# Patient Record
Sex: MALE | Race: WHITE | Employment: FULL TIME | URBAN - METROPOLITAN AREA
[De-identification: names, ages, dates, MRNs, and addresses within clinical notes are randomized per-mention and may not be internally consistent; named-entity substitution may affect disease eponyms.]

---

## 2019-04-29 ENCOUNTER — HOSPITAL ENCOUNTER (OUTPATIENT)
Dept: PHYSICAL THERAPY | Age: 54
Setting detail: THERAPIES SERIES
Discharge: HOME | End: 2019-04-29
Payer: COMMERCIAL

## 2019-04-29 PROCEDURE — 97161 PT EVAL LOW COMPLEX 20 MIN: CPT

## 2019-04-29 PROCEDURE — 97016 VASOPNEUMATIC DEVICE THERAPY: CPT

## 2019-04-29 PROCEDURE — G0283 ELEC STIM OTHER THAN WOUND: HCPCS

## 2019-05-01 NOTE — PROGRESS NOTES
Physical Therapy  Initial Assessment  Date: 2019  Patient Name: Steffen Bustamante  MRN: [id number]  : 1965     Treatment Diagnosis: Decreased mobility of the (R) shoulder with scapular weakness    Restrictions  Position Activity Restriction  Other position/activity restrictions: No activity restriction per physician's order. General  Chart Reviewed: Yes  Patient assessed for rehabilitation services?: Yes  Response To Previous Treatment: Not applicable  Family / Caregiver Present: No  Referring Practitioner: Paige Liriano MD  Referral Date : 19  Diagnosis: Post-acromioplasty  Follows Commands: Within Functional Limits  General Comment  Comments: Pt. did have an MRI (min. supraspinatus tendinopathy)  PT Visit Information  Onset Date: 19  PT Insurance Information: Medical [place]  Total # of Visits Approved: 12  Total # of Visits to Date: 1  No Show: 0  Canceled Appointment: 0  Subjective  Pt. has never had physical therapy before. Had a shoulder scope last Wednesday (19) at [facility] and had some spurs removed. Pt. stated he has trouble with mobility and wants to gain back his ROM. At rest pt's shoulder is pain free, but has pain with movements (flexion/extension/IR/abduction)  Pain Screening  Patient Currently in Pain: No  Pain Assessment  Multiple Pain Sites: No  Vision/Hearing  Vision  Vision: Within Functional Limits  Hearing  Hearing: Within functional limits  Orientation  Overall Orientation Status: Within Normal Limits  Social/Functional History  Occupation: Full time employment  Type of occupation: Superintendent    Objective  Observation/Palpation  Palpation: Tender to the touch (R) AC region, the ports, and lateral portion of the (R) shoulder  Observation: Slight upward rotation of the (R) scapula  Range of Motion  PROM RUE (degrees)  RUE General PROM: Firm end feel for both (R) shoulder flexion and abduction.  Scapular elevation - pt. was able to gain full motion, but (R) shoulder motion wasn't smooth, Elbow flexion and extension - WNL  R Shoulder Flex  0-180: 80 degrees  R Shoulder ABduction 0-180: 67 degrees  R Shoulder Int Rotation  0-70: 20 degrees @ 45 degrees  R Shoulder Ext Rotation  0-90: 10 degrees @ 45 degrees  Spine  Cervical: WNL  Additional Measures  Special Tests: Flip test (+) - weakness of the serratus anterior and upper trapezius    Assessment  Conditions Requiring Skilled Therapeutic Intervention  Body structures, Functions, Activity limitations: Decreased functional mobility ; Decreased ROM; Decreased strength;Decreased ADL status; Increased Pain  Assessment: Limited motion of the (R) shoulder was noted, with tenderness surrounding the joint. Scapular weakness was demonstrated with the positive flip test.  Treatment Diagnosis: Decreased mobility of the (R) shoulder with scapular weakness  Prognosis: Good  Decision Making: Low Complexity  History: No personal factors were apparent that may impact the pt's plan of care. Exam: NRPS 0/10, limited ROM of the (R) shoulder, decreased (R) scapular/shoulder strength. Clinical Presentation: Pt's symptoms are evolving    Activity Tolerance  Patient Tolerated treatment well    Plan  Times per week: 3  Plan weeks: 4  Current Treatment Recommendations: ROM, Pain Management, Strengthening    Outcomes Score  QuickDASH Total Score: 28          Goals  Short term goals  Time Frame for Short term goals: 6 visits  Short term goal 1: Pt. will demonstrate independence with home exercise program.  Short term goal 2: Pt. will demonstrate increased ROM of the (R) shoulder. Long term goals  Time Frame for Long term goals : 12 visits  Long term goal 1: Pt. will demonstrate WNL for ROM of the (R) shoulder. Long term goal 2: Pt's QuickDASH score will improve by 15 points compared to initial QuickDASH score. Long term goal 3: Pt. will report 0/10 on the pain scale for functional ADLs.     Patient Goals: Wants to gain full mobility back.    Comments/Assessment: Pt would benefit from physical therapy to assist with ROM and strength for his (R) shoulder to allow him to perform his ADLs without pain/limitation. Rehab Potential:  [x] Good  [] Fair  [] Poor   Suggested Professional Referral:  [x] No  [] Yes:  Barriers to Goal Achievement:  [x] No  [] Yes:  Domestic Concerns:  [x] No  [] Yes:    Treatment Plan:  [x] Therapeutic Exercise    [x] Modalities:  [] Therapeutic Activity    [] Ultrasound  [x] Electrical Stimulation  [] Gait Training     [] Massage       [] Lumbar/Cervical Traction  [] Neuromuscular Re-education [] Cold/hot pack [] Other:  [x] Instruction in HEP              [] Work Conditioning                                  [x] Manual Therapy                     [] Aquatic Therapy     [x] Vasocompression  [] Iontophoresis: 4 mg/mL                      Dexamethasone Sodium      Phosphate 40-80mAmin (Allergies Reviewed)     Frequency:     3      X/wk x    4      wk's      [x] Plans/Goals, Risk/Benefits discussed with pt/family  Comprehension of Education [] yes  [] Needs Review  Pt/Family Education: [] Verbal  [] Demo  [] Written    More objective information is available upon request.  Thank you for this referral.        Medicare/Regulatory Requirements:  I have reviewed this plan of care and certify a need for   Medically necessary rehabilitation services.   [] Physician Signature    Date:     Electronically signed by: Best Abebe PT DPT    [facility]) @ [place] [address].  Phone [phone]  Fax [phone]    Therapy Time   Individual Concurrent Group Co-treatment   Time In 1815         Time Out 1915         Minutes 60           Treatment Charges: Minutes Units   []  Ultrasound     [x]  Electrical-Stim 15 1   []  Iontophoresis     []  Traction     []  Massage       [x]  Eval 45 1   []  Gait     []  Ther Exercise       []  Manual Therapy       [] Ther Activities       []  Aquatics     [x]  Vasopneumatic Device 15 1   []  Neuro Re-Ed       []  Other       Total Treatment Time: 60 3

## 2019-05-02 ENCOUNTER — HOSPITAL ENCOUNTER (OUTPATIENT)
Dept: PHYSICAL THERAPY | Age: 54
Setting detail: THERAPIES SERIES
Discharge: HOME | End: 2019-05-02
Payer: COMMERCIAL

## 2019-05-02 PROCEDURE — G0283 ELEC STIM OTHER THAN WOUND: HCPCS

## 2019-05-02 PROCEDURE — 97110 THERAPEUTIC EXERCISES: CPT

## 2019-05-02 PROCEDURE — 97016 VASOPNEUMATIC DEVICE THERAPY: CPT

## 2019-05-02 NOTE — PROGRESS NOTES
Physical Therapy  Daily Treatment Note  Date: 2019  Patient Name: Emily Penn  MRN: [id number]     :   1965    General:  Chart Reviewed: Yes  Response To Previous Treatment: Not applicable  Family / Caregiver Present: No  Referring Practitioner: Harvey Huertas MD  PT Visit Information  Onset Date: 19  PT Insurance Information: Medical [place]  Total # of Visits Approved: 12  Total # of Visits to Date: 2  No Show: 0  Canceled Appointment: 0    Subjective  Pt. still has pain with movement and \"seems to be getting better. \" Pt. states he has much more AROM. Pt. cannot lay on the shoulder at all. However can sleep in his bed now. Shoulder aches while sleeping and if the shoulder is hit wrong it will have a sharp pain  Pain Screening  Patient Currently in Pain: No  Pain Assessment  Multiple Pain Sites: No     Treatment Activities:  Range of Motion  PROM RUE (degrees)  RUE General PROM: Firm end feel for both (R) shoulder flexion and abduction.  Scapular elevation - pt. was able to gain full motion, but (R) shoulder motion wasn't smooth, Elbow flexion and extension - WNL  R Shoulder Flex  0-180: 90 degrees  R Shoulder ABduction 0-180: 67 degrees  R Shoulder Int Rotation  0-70: 40 degrees @ 45 degrees  R Shoulder Ext Rotation  0-90: 15 degrees @ 45 degrees    Strength  Strength RUE  R Elbow Flexion: 5/5  R Elbow Extension: 5/5    Exercises  Exercise 1: Supine (R) shoulder stretches of IR/ER at 45 degrees, Flexion/abduction 30 sec hold x 3 sets  Exercise 2: Seated (B) scapular retraction 20 reps  Exercise 3: Seated (B) shoulder shrugs 20 reps  Exercise 4: Supine (R) scapular protraction 10 reps  Exercise 5: Seated (R) posterior capsule stretch 30 sec hold x 3 sets (self stretch)  Exercise 6: Standing (R) anterior capsule stretch 30 sec hold x 3 sets (self stretch)  Exercise 7: Seated (R) shoulder flexion stretch 30 sec hold x 3 sets (self stretch - slide body back away from arm on the table)  Exercise 8: Seated (R) shoulder abduction stretch 30 sec hold x 3 sets (self stretch - slide body back away from arm on the table)    Modalities  Cryotherapy (Minutes\Location): Vasopneumatic Shoulder Sleeve, 34 degrees, low compression x 15 minutes (semi-reclined position)  E-stim (parameters): IFC to the (R) GH joint x 15 minutes (semi-reclined position)     Assessment  Conditions Requiring Skilled Therapeutic Intervention  Body structures, Functions, Activity limitations: Decreased functional mobility ; Decreased ROM; Decreased strength;Decreased ADL status; Increased Pain  Assessment: Limited motion of the (R) shoulder was noted; however, ROM in flexion/ER/IR all improved. Treatment Diagnosis: Decreased mobility of the (R) shoulder with scapular weakness  Prognosis: Good  Patient Education: Home exercise program was given to the pt. and included seated (R) shoulder posterior capsule self stretch 30 sec hold x 3 sets, standing (R) shoulder anterior capsule self stretch 30 sec hold x 3 sets, seated (R) shoulder flexion self stretch (arm on table & slide body away from table) 30 sec hold x 3 sets, seated (R) shoulder abduction self stretch (arm on table & slide body away from table) 30 sec hold x 3 sets.      Plan  Times per week: 3  Plan weeks: 4  Current Treatment Recommendations: ROM, Pain Management, Strengthening, Home Exercise Program      Therapy Time   Individual Concurrent Group Co-treatment   Time In 0900         Time Out 1015         Minutes 75           Treatment Charges: Minutes Units   []  Ultrasound     [x]  Electrical-Stim 15 1   []  Iontophoresis     []  Traction     []  Massage       []  Eval     []  Gait     [x]  Ther Exercise 45   3   []  Manual Therapy       []  Ther Activities       []  Aquatics     [x]  Vasopneumatic Device 15 1   []  Neuro Re-Ed       []  Other       Total Treatment Time: 61  5     Musa Rodriguez, Student PT

## 2019-05-06 ENCOUNTER — HOSPITAL ENCOUNTER (OUTPATIENT)
Dept: PHYSICAL THERAPY | Age: 54
Setting detail: THERAPIES SERIES
Discharge: HOME | End: 2019-05-06
Payer: COMMERCIAL

## 2019-05-06 PROCEDURE — G0283 ELEC STIM OTHER THAN WOUND: HCPCS

## 2019-05-06 PROCEDURE — 97110 THERAPEUTIC EXERCISES: CPT

## 2019-05-06 NOTE — PROGRESS NOTES
Physical Therapy  Daily Treatment Note  Date: 2019  Patient Name: Ning Hess  MRN: [id number]     :   1965    General  Chart Reviewed: Yes  Response To Previous Treatment: Not applicable  Family / Caregiver Present: No  Referring Practitioner: Monika Delarosa MD  PT Visit Information  Onset Date: 19  PT Insurance Information: Medical [place]  Total # of Visits Approved: 12  Total # of Visits to Date: 3  No Show: 0  Canceled Appointment: 0    Subjective  Pt. stated that sometimes he will straighten his elbow and have a \"sharp, burning\" pain in his forearm. Pt. stated that his shoulder is sore/tender when he puts any type of pressure on it. Pain Screening  Patient Currently in Pain: No     Treatment Activities:  Exercises  Exercise 1: Supine (R) shoulder stretches of IR/ER at 45 degrees, Flexion/abduction 30 sec hold x 3 sets  Exercise 2: Seated (B) scapular retraction 20 reps  Exercise 3: Seated (B) shoulder shrugs 20 reps  Exercise 4: Supine (R) scapular protraction 10 reps x 2 sets  Exercise 7: Seated (R) shoulder flexion stretch 30 sec hold x 3 sets (self stretch - slide body back away from arm on the table)  Exercise 8: Seated (R) shoulder abduction stretch 30 sec hold x 3 sets (self stretch - slide body back away from arm on the table)  Exercise 9: Supine (R) shoulder flexion 5 reps (following stretch)  Exercise 10: Supine (R) shoulder abduction 5 reps (following stretch)    Modalities  Cryotherapy (Minutes\Location): Ice Pack to the (R) shoulder x 15 minutes (seated position)  E-stim (parameters): IFC to the (R) GH joint x 15 minutes (seated position)     Activity Tolerance  Patient Tolerated treatment well    Assessment  Conditions Requiring Skilled Therapeutic Intervention  Body structures, Functions, Activity limitations: Decreased functional mobility ; Decreased ROM; Decreased strength;Decreased ADL status; Increased Pain  Assessment: Implemented AROM in the supine position after each stretch (flexion/abduction/ER/IR), pt. still struggles with (R) shoulder abduction.   Treatment Diagnosis: Decreased mobility of the (R) shoulder with scapular weakness  Prognosis: Good     Plan  Times per week: 3  Plan weeks: 4  Current Treatment Recommendations: ROM, Pain Management, Strengthening, Home Exercise Program    Therapy Time   Individual Concurrent Group Co-treatment   Time In 0900         Time Out 1000         Minutes 60           Treatment Charges: Minutes Units   []  Ultrasound     [x]  Electrical-Stim 15 1   []  Iontophoresis     []  Traction     []  Massage       []  Eval     []  Gait     [x]  Ther Exercise  40  3   []  Manual Therapy       []  Ther Activities       []  Aquatics     []  Vasopneumatic Device     []  Neuro Re-Ed       [x]  Other: Ice Pack 15   0   Total Treatment Time: 54  4     Jesus Bello, Student PT

## 2019-05-08 ENCOUNTER — HOSPITAL ENCOUNTER (OUTPATIENT)
Dept: PHYSICAL THERAPY | Age: 54
Setting detail: THERAPIES SERIES
Discharge: HOME | End: 2019-05-08
Payer: COMMERCIAL

## 2019-05-08 PROCEDURE — 97016 VASOPNEUMATIC DEVICE THERAPY: CPT

## 2019-05-08 PROCEDURE — G0283 ELEC STIM OTHER THAN WOUND: HCPCS

## 2019-05-08 PROCEDURE — 97110 THERAPEUTIC EXERCISES: CPT

## 2019-05-08 NOTE — PROGRESS NOTES
Physical Therapy  Daily Treatment Note  Date: 2019  Patient Name: Sami Saavedra  MRN: [id number]     :   1965    General  Chart Reviewed: Yes  Response To Previous Treatment: Not applicable  Family / Caregiver Present: No  Referring Practitioner: Zane Hernandez MD  PT Visit Information  Onset Date: 19  PT Insurance Information: Medical [place]  Total # of Visits Approved: 12  Total # of Visits to Date: 4  No Show: 0  Canceled Appointment: 0    Subjective  Subjective: Pt. stated that with active end range of movements of the shoulder he experiences some pain in the (R) shoulder. Pain Screening  Patient Currently in Pain: No     Treatment Activities  Exercises  Exercise 2: Seated (B) scapular retraction 20 reps  Exercise 3: Seated (B) shoulder shrugs 20 reps  Exercise 4: Supine (R) scapular protraction 10 reps x 2 sets  Exercise 5: Seated (R) posterior capsule stretch 30 sec hold x 3 sets (self stretch)  Exercise 6: Standing (R) anterior capsule stretch 30 sec hold x 3 sets (self stretch)  Exercise 9: Supine (R) shoulder flexion 10 reps  Exercise 10: Supine (R) shoulder abduction 10 reps   Exercise 11: Seated Pulleys (B) shoulder flexion/abduction 5 mins each  Exercise 12: Standing (R) shoulder IR stretch 30 sec hold x 3 sets (with belt)    Modalities  Cryotherapy (Minutes\Location): Vasopneumatic Shoulder Sleeve, 34 degrees, low compression x 15 minutes (semi-reclined position)  E-stim (parameters): IFC to the (R) GH joint x 15 minutes (seated position)    Activity Tolerance  Patient tolerated treatment well     Assessment  Conditions Requiring Skilled Therapeutic Intervention  Body structures, Functions, Activity limitations: Decreased functional mobility ; Decreased ROM; Decreased strength;Decreased ADL status; Increased Pain  Treatment Diagnosis: Decreased mobility of the (R) shoulder with scapular weakness  Prognosis: Good     Plan  Times per week: 3  Plan weeks: 4  Current Treatment Recommendations: ROM, Pain Management, Strengthening, Home Exercise Program    Therapy Time   Individual Concurrent Group Co-treatment   Time In 8040         Time Out 1820         Minutes 50           Treatment Charges: Minutes Units   []  Ultrasound     [x]  Electrical-Stim 15 1   []  Iontophoresis     []  Traction     []  Massage       []  Eval     []  Gait     [x]  Ther Exercise 30  2    []  Manual Therapy       []  Ther Activities       []  Aquatics     [x]  Vasopneumatic Device 15 1   []  Neuro Re-Ed       []  Other       Total Treatment Time: 39 4      Clark Ngo, Student PT

## 2019-05-10 ENCOUNTER — HOSPITAL ENCOUNTER (OUTPATIENT)
Dept: PHYSICAL THERAPY | Age: 54
Setting detail: THERAPIES SERIES
Discharge: HOME | End: 2019-05-10
Payer: COMMERCIAL

## 2019-05-13 ENCOUNTER — HOSPITAL ENCOUNTER (OUTPATIENT)
Dept: PHYSICAL THERAPY | Age: 54
Setting detail: THERAPIES SERIES
Discharge: HOME | End: 2019-05-13
Payer: COMMERCIAL

## 2019-05-13 PROCEDURE — 97110 THERAPEUTIC EXERCISES: CPT

## 2019-05-13 PROCEDURE — 97016 VASOPNEUMATIC DEVICE THERAPY: CPT

## 2019-05-13 PROCEDURE — G0283 ELEC STIM OTHER THAN WOUND: HCPCS

## 2019-05-13 NOTE — PROGRESS NOTES
Physical Therapy  Daily Treatment Note  Date: 2019  Patient Name: Kenisha Arrieta  MRN: [id number]     :   1965    General  Chart Reviewed: Yes  Response To Previous Treatment: Not applicable  Family / Caregiver Present: No  Referring Practitioner: Gricelda Greenberg MD  PT Visit Information  Onset Date: 19  PT Insurance Information: Medical [place]  Total # of Visits Approved: 12  Total # of Visits to Date: 4  No Show: 0  Canceled Appointment: 1    Subjective  Pt. stated that he has some soreness of the posterior aspect of his (R) shoulder due to overdoing it over the weekend. Pain Screening  Patient Currently in Pain: No     Treatment Activities  Exercises  Exercise 2: Seated (B) scapular retraction 20 reps  Exercise 3: Seated (B) shoulder shrugs 10 lbs 10 reps x 3 sets  Exercise 4: Supine (R) scapular protraction 3 lbs 10 reps x 2 sets  Exercise 5: Seated (R) posterior capsule stretch 30 sec hold x 3 sets (self stretch)  Exercise 6: Standing (R) anterior capsule stretch 30 sec hold x 3 sets (self stretch)  Exercise 9: Supine (R) shoulder flexion 10 reps x 2 sets  Exercise 10: Supine (R) shoulder abduction 10 reps   Exercise 11: Seated Pulleys (B) shoulder flexion/abduction 5 mins each  Exercise 12: Standing (R) shoulder IR stretch 30 sec hold x 3 sets (with belt)    Modalities  Cryotherapy (Minutes\Location): Vasopneumatic Shoulder Sleeve, 34 degrees, low compression x 15 minutes (seated position)  E-stim (parameters): IFC to the (R) GH joint x 15 minutes (seated position)     Activity Tolerance  Patient tolerated treatment well    Assessment  Conditions Requiring Skilled Therapeutic Intervention  Body structures, Functions, Activity limitations: Decreased functional mobility ; Decreased ROM; Decreased strength;Decreased ADL status; Increased Pain  Assessment: Pt. still has pain with (R) shoulder abduction. Additional weight was added for shrugs and protraction.   Treatment Diagnosis: Decreased mobility of the (R) shoulder with scapular weakness  Prognosis: Good     Plan  Times per week: 3  Plan weeks: 4  Current Treatment Recommendations: ROM, Pain Management, Strengthening, Home Exercise Program    Therapy Time   Individual Concurrent Group Co-treatment   Time In 1600         Time Out 1700         Minutes 60           Treatment Charges: Minutes Units   []  Ultrasound     [x]  Electrical-Stim 15 1   []  Iontophoresis     []  Traction     []  Massage       []  Eval     []  Gait     [x]  Ther Exercise 40  3    []  Manual Therapy       []  Ther Activities       []  Aquatics     [x]  Vasopneumatic Device 15 1   []  Neuro Re-Ed       []  Other       Total Treatment Time: 54 5      Musa Rodriguez, Student PT

## 2019-05-15 ENCOUNTER — HOSPITAL ENCOUNTER (OUTPATIENT)
Dept: PHYSICAL THERAPY | Age: 54
Setting detail: THERAPIES SERIES
Discharge: HOME | End: 2019-05-15
Payer: COMMERCIAL

## 2019-05-15 PROCEDURE — 97110 THERAPEUTIC EXERCISES: CPT

## 2019-05-15 PROCEDURE — 97016 VASOPNEUMATIC DEVICE THERAPY: CPT

## 2019-05-15 PROCEDURE — G0283 ELEC STIM OTHER THAN WOUND: HCPCS

## 2019-05-15 NOTE — PROGRESS NOTES
Physical Therapy  Daily Treatment Note  Date: 5/15/2019  Patient Name: Etta Mckeon  MRN: [id number]     :   1965    General  Chart Reviewed: Yes  Response To Previous Treatment: Not applicable  Family / Caregiver Present: No  Referring Practitioner: Lindy Rand MD  PT Visit Information  Onset Date: 19  PT Insurance Information: Medical [place]  Total # of Visits Approved: 12  Total # of Visits to Date: 5  No Show: 0  Canceled Appointment: 1    Subjective  Pt. reports no soreness today. Pain Screening  Patient Currently in Pain: No     Treatment Activities  Exercises  Exercise 2: Seated (B) scapular retraction 15 lbs 15 reps x 3 sets  Exercise 3: Seated (B) shoulder shrugs 13 lbs 10 reps x 3 sets  Exercise 4: Supine (R) scapular protraction 6 lbs 10 reps x 2 sets  Exercise 5: Seated (R) posterior capsule stretch 30 sec hold x 3 sets (self stretch)  Exercise 6: Standing (R) anterior capsule stretch 30 sec hold x 3 sets (self stretch)  Exercise 9: Supine (R) shoulder flexion 10 reps x 2 sets  Exercise 10: Supine (R) shoulder abduction 10 reps   Exercise 11: Seated Pulleys (B) shoulder flexion/abduction 5 mins each  Exercise 12: Standing (R) shoulder IR stretch 30 sec hold x 3 sets (with belt)  Exercise 13: Side-lying (R) shoulder ER 2 lbs 10 reps x 2 sets    Modalities  Cryotherapy (Minutes\Location): Vasopneumatic Shoulder Sleeve, 34 degrees, low compression x 15 minutes (seated position)  E-stim (parameters): IFC to the (R) GH joint x 15 minutes (seated position)    Activity Tolerance  Patient tolerated treatment well     Assessment  Conditions Requiring Skilled Therapeutic Intervention  Body structures, Functions, Activity limitations: Decreased functional mobility ; Decreased ROM; Decreased strength;Decreased ADL status; Increased Pain  Assessment: (R) shoulder abduction is still lacking in AROM and causes pain for the patient.  The force couple of the upper trap and serratus anterior is improving and the patient was able to do more weight for both shrugs and (R) shoulder protraction. Patient's (R) shoulder IRs are relatively tight and the ERs have good ROM and are improving in strength, demonstrated by the addition of weight with (R) shoulder ER.   Treatment Diagnosis: Decreased mobility of the (R) shoulder with scapular weakness  Prognosis: Good    Plan  Times per week: 3  Plan weeks: 4  Current Treatment Recommendations: ROM, Pain Management, Strengthening, Home Exercise Program    Therapy Time   Individual Concurrent Group Co-treatment   Time In 1130         Time Out 1230         Minutes 60           Treatment Charges: Minutes Units   []  Ultrasound     [x]  Electrical-Stim 15 1   []  Iontophoresis     []  Traction     []  Massage       []  Eval     []  Gait     [x]  Ther Exercise 40  3    []  Manual Therapy       []  Ther Activities       []  Aquatics     [x]  Vasopneumatic Device 15 1   []  Neuro Re-Ed       []  Other       Total Treatment Time: 54  4     Svetlana Bartlett, Student PT

## 2019-05-17 ENCOUNTER — HOSPITAL ENCOUNTER (OUTPATIENT)
Dept: PHYSICAL THERAPY | Age: 54
Setting detail: THERAPIES SERIES
Discharge: HOME | End: 2019-05-17
Payer: COMMERCIAL

## 2019-05-17 PROCEDURE — 97110 THERAPEUTIC EXERCISES: CPT

## 2019-05-17 PROCEDURE — G0283 ELEC STIM OTHER THAN WOUND: HCPCS

## 2019-05-17 PROCEDURE — 97016 VASOPNEUMATIC DEVICE THERAPY: CPT

## 2019-05-17 NOTE — PROGRESS NOTES
Physical Therapy  Daily Treatment Note  Date: 2019  Patient Name: Sami Saavedra  MRN: [id number]     :   1965    General  Chart Reviewed: Yes  Response To Previous Treatment: Not applicable  Family / Caregiver Present: No  Referring Practitioner: Zane Hernandez MD  PT Visit Information  Onset Date: 19  PT Insurance Information: Medical [place]  Total # of Visits Approved: 12  Total # of Visits to Date: 6  No Show: 0  Canceled Appointment: 1    Subjective  Pain Screening  Patient Currently in Pain: No  Pain Assessment  Multiple Pain Sites: No     Treatment Activities  Exercises  Exercise 2: Seated (B) scapular retraction 15 lbs 15 reps x 3 sets  Exercise 3: Standing (B) shoulder shrugs 13 lbs 10 reps x 3 sets  Exercise 4: Supine (R) scapular protraction 6 lbs 10 reps x 2 sets  Exercise 5: Seated (R) posterior capsule stretch 30 sec hold x 3 sets (self stretch)  Exercise 6: Standing (R) anterior capsule stretch 30 sec hold x 3 sets (self stretch)  Exercise 9: Supine (R) shoulder flexion 10 reps x 2 sets  Exercise 10: Supine (R) shoulder abduction 10 reps   Exercise 11: Seated Pulleys (B) shoulder flexion/abduction 5 mins each  Exercise 12: Standing (R) shoulder IR stretch 30 sec hold x 3 sets (with belt)  Exercise 13: Side-lying (R) shoulder ER 2 lbs 10 reps x 3 sets    Modalities  Cryotherapy (Minutes\Location): Vasopneumatic Shoulder Sleeve, 34 degrees, low compression x 15 minutes (seated position)  E-stim (parameters): IFC to the (R) GH joint x 15 minutes (seated position)    Activity Tolerance  Patient tolerated treatment well    Assessment  Conditions Requiring Skilled Therapeutic Intervention  Body structures, Functions, Activity limitations: Decreased functional mobility ; Decreased ROM; Decreased strength;Decreased ADL status; Increased Pain  Treatment Diagnosis: Decreased mobility of the (R) shoulder with scapular weakness  Prognosis: Good    Plan  Times per week: 3  Plan weeks: 4  Current

## 2019-05-20 ENCOUNTER — HOSPITAL ENCOUNTER (OUTPATIENT)
Dept: PHYSICAL THERAPY | Age: 54
Setting detail: THERAPIES SERIES
Discharge: HOME | End: 2019-05-20
Payer: COMMERCIAL

## 2019-05-20 PROCEDURE — 97110 THERAPEUTIC EXERCISES: CPT

## 2019-05-20 PROCEDURE — G0283 ELEC STIM OTHER THAN WOUND: HCPCS

## 2019-05-21 NOTE — PROGRESS NOTES
[address]   Outpatient Physical Therapy  [address]  Phone: [phone]  Fax: [phone]  Daily Progress Note    Date: 19    Patient Name: Etta Mckeon        MRN: [id number]  Account: [de-identified] : 1965      General Information  Chart Reviewed: Yes  Patient assessed for rehabilitation services?: Yes  Response To Previous Treatment: Not applicable  Family / Caregiver Present: No  Referring Practitioner: Lindy Rand MD  Referral Date : 19  Diagnosis: Post-acromioplasty  Follows Commands: Within Functional Limits  Onset Date: 19  PT Insurance Information: Medical [place]  Total # of Visits Approved: 12  Total # of Visits to Date: 7  No Show: 0  Canceled Appointment: 1    Subjective  No pain today, nothing new to report.  Patient notices that his shoulder is getting better, he has much more ROM with shoulder flexion     Pain  Patient Currently in Pain: No    Objective  Exercise 2: Seated (B) scapular retraction 15 lbs 15 reps x 3 sets  Exercise 3: Standing (B) shoulder shrugs 13 lbs 10 reps x 3 sets  Exercise 4: Supine (R) scapular protraction 6 lbs 10 reps x 2 sets  Exercise 5: Seated (R) posterior capsule stretch 30 sec hold x 3 sets (self stretch)  Exercise 6: Standing (R) anterior capsule stretch 30 sec hold x 3 sets (self stretch)  Exercise 9: Supine (R) shoulder flexion 10 reps x 2 sets  Exercise 10: Supine (R) shoulder abduction 10 reps   Exercise 11: Seated Pulleys (B) shoulder flexion/abduction 5 mins each  Exercise 12: Standing (R) shoulder IR stretch 30 sec hold x 3 sets (with belt)  Exercise 13: Side-lying (R) shoulder ER 2 lbs 10 reps x 3 sets    Modalities   Cryotherapy (Minutes\Location): Vasopneumatic Shoulder Sleeve, 34 degrees, low compression x 15 minutes (seated position)  E-stim (parameters): IFC to the (R) GH joint x 15 minutes (seated position)    Assessment  Body structures, Functions, Activity limitations: Decreased functional mobility ; Decreased ROM; Decreased strength;Decreased ADL status; Increased Pain  Assessment: Patient continues to benefit and be challenged by his current exercise plan, without increase in symptoms.   Treatment Diagnosis: Decreased mobility of the (R) shoulder with scapular weakness  Prognosis: Good    Therapy Time  Time In: 1650  Time Out: 1747  Minutes: 57    Treatment Charges: Minutes Units   []  Ultrasound     [x]  Electrical-Stim 15 1   []  Iontophoresis     []  Traction     []  Massage       []  Eval     []  Gait     [x]  Vasopneumatic Device 15 with e-stim 0   [x]  Ther Exercise 25  2   []  Manual Therapy       []  Ther Activities       []  Aquatics     []  Neuro Re-Ed       []  Other       Total Treatment Time: Anu Roldan 50, PTA

## 2019-05-22 ENCOUNTER — APPOINTMENT (OUTPATIENT)
Dept: PHYSICAL THERAPY | Age: 54
End: 2019-05-22
Payer: COMMERCIAL

## 2019-05-24 ENCOUNTER — HOSPITAL ENCOUNTER (OUTPATIENT)
Dept: PHYSICAL THERAPY | Age: 54
Setting detail: THERAPIES SERIES
Discharge: HOME | End: 2019-05-24
Payer: COMMERCIAL

## 2019-05-24 PROCEDURE — 97110 THERAPEUTIC EXERCISES: CPT

## 2019-05-24 PROCEDURE — 97016 VASOPNEUMATIC DEVICE THERAPY: CPT

## 2019-05-24 PROCEDURE — G0283 ELEC STIM OTHER THAN WOUND: HCPCS

## 2019-05-24 NOTE — PROGRESS NOTES
Physical Therapy  Daily Treatment Note  Date: 2019  Patient Name: Pedro Hackett  MRN: [id number]     :   1965    General  Chart Reviewed: Yes  Response To Previous Treatment: Not applicable  Family / Caregiver Present: No  Referring Practitioner: Bry Arias MD  PT Visit Information  Onset Date: 19  PT Insurance Information: Medical [place]  Total # of Visits Approved: 12  Total # of Visits to Date: 8  No Show: 0  Canceled Appointment: 1    Subjective  Pain Screening  Patient Currently in Pain: No    Treatment Activities  Exercises  Exercise 1: Supine (B) shoulder elevation with wand 5 reps x 4 sets with one hand  outward per set. Exercise 2: Supine (R) shoulder abduction with wand 10 reps (into the barrier each time)   Exercise 3: Supine (R) shoulder ER with wand 10 reps (into the barrier each time)   Exercise 4: Standing (R) shoulder IR stretch 30 sec hold x 3 sets (with belt)  Exercise 5: Supine (R) scapular protraction 6 lbs 10 reps x 3 sets  Exercise 6: Prone (R) shoulder extension 10 reps x 2 sets   Exercise 7: Prone (R) shoulder rows 10 reps x 2 sets   Exercise 8: Prone (R) shoulder horizontal abduction 10 reps   Exercise 9: Side lying (R) shoulder ER 10 reps x 2 sets   Exercise 10: Seated (R) shoulder flexion 10 reps   Exercise 11: Seated (R) shoulder abduction (elbow bent) 10 reps     Modalities  Cryotherapy (Minutes\Location): Vasopneumatic Shoulder Sleeve, 34 degrees, low compression x 15 minutes (seated position)  E-stim (parameters): IFC to the (R) GH joint x 15 minutes (seated position)    Activity Tolerance  Patient tolerated treatment well    Assessment  Conditions Requiring Skilled Therapeutic Intervention  Body structures, Functions, Activity limitations: Decreased functional mobility ; Decreased ROM; Decreased strength;Decreased ADL status; Increased Pain  Treatment Diagnosis: Decreased mobility of the (R) shoulder with scapular weakness  Prognosis: Good    Plan  Times per week: 3  Plan weeks: 4  Current Treatment Recommendations: ROM, Pain Management, Strengthening, Home Exercise Program    Therapy Time   Individual Concurrent Group Co-treatment   Time In 1600         Time Out 1700         Minutes 60           Treatment Charges: Minutes Units   []  Ultrasound     [x]  Electrical-Stim 15 1   []  Iontophoresis     []  Traction     []  Massage       []  Eval     []  Gait     [x]  Ther Exercise 30  2    []  Manual Therapy       []  Ther Activities       []  Aquatics     [x]  Vasopneumatic Device 15 1   []  Neuro Re-Ed       []  Other       Total Treatment Time:        Tejas Slaughter, PT DPT

## 2019-05-29 ENCOUNTER — HOSPITAL ENCOUNTER (OUTPATIENT)
Dept: PHYSICAL THERAPY | Age: 54
Setting detail: THERAPIES SERIES
Discharge: HOME | End: 2019-05-29
Payer: COMMERCIAL

## 2019-05-29 PROCEDURE — 97110 THERAPEUTIC EXERCISES: CPT

## 2019-05-29 PROCEDURE — 97016 VASOPNEUMATIC DEVICE THERAPY: CPT

## 2019-05-29 PROCEDURE — G0283 ELEC STIM OTHER THAN WOUND: HCPCS

## 2019-05-29 NOTE — PROGRESS NOTES
Physical Therapy  Daily Treatment Note  Date: 2019  Patient Name: Pam Salazar  MRN: [id number]     :   1965    General  Chart Reviewed: Yes  Response To Previous Treatment: Not applicable  Family / Caregiver Present: No  Referring Practitioner: Anaya Reynaga MD  PT Visit Information  Onset Date: 19  PT Insurance Information: Medical [place]  Total # of Visits Approved: 12  Total # of Visits to Date: 9  No Show: 0  Canceled Appointment: 1    Subjective  Pain Screening  Patient Currently in Pain: No     Treatment Activities  Exercises  Exercise 1: Supine (B) shoulder elevation with wand 5 reps x 4 sets with one hand  outward per set. Exercise 2: Supine (R) shoulder abduction with wand 10 reps (into the barrier each time)   Exercise 3: Supine (R) shoulder ER with wand 10 reps (into the barrier each time)   Exercise 4: Standing (R) shoulder IR stretch 30 sec hold x 3 sets (with belt)  Exercise 5: Supine (R) scapular protraction 6 lbs 10 reps x 3 sets  Exercise 6: Prone (R) shoulder extension 10 reps x 2 sets   Exercise 7: Prone (R) shoulder rows 10 reps x 2 sets   Exercise 8: Prone (R) shoulder horizontal abduction 10 reps   Exercise 9: Side lying (R) shoulder ER 10 reps x 2 sets   Exercise 10: Seated (R) shoulder flexion 10 reps   Exercise 11: Seated (R) shoulder abduction (elbow bent) 10 reps   Manual therapy  PROM: Supine Flexion, ER and IR (mid range) 30 sec hold x 3 reps with each one. Activity Tolerance  Patient tolerated treatment well    Assessment  Conditions Requiring Skilled Therapeutic Intervention  Body structures, Functions, Activity limitations: Decreased functional mobility ; Decreased ROM; Decreased strength;Decreased ADL status; Increased Pain  Treatment Diagnosis: Decreased mobility of the (R) shoulder with scapular weakness  Prognosis: Good     Plan  Times per week: 3  Plan weeks: 4  Current Treatment Recommendations: ROM, Pain Management, Strengthening, Home Exercise

## 2019-06-05 ENCOUNTER — HOSPITAL ENCOUNTER (OUTPATIENT)
Dept: PHYSICAL THERAPY | Age: 54
Setting detail: THERAPIES SERIES
Discharge: HOME | End: 2019-06-05
Payer: COMMERCIAL

## 2019-06-05 PROCEDURE — 97110 THERAPEUTIC EXERCISES: CPT

## 2019-06-05 NOTE — PROGRESS NOTES
free upon completion of treatment today. Advised to ice later this evening for pain control.    Treatment Diagnosis: Decreased mobility of the (R) shoulder with scapular weakness  Prognosis: Good    Plan  Times per week: 3  Plan weeks: 4  Current Treatment Recommendations: ROM, Pain Management, Strengthening, Home Exercise Program    Therapy Time   Individual Concurrent Group Co-treatment   Time In 1450         Time Out 1550         Minutes 60           Treatment Charges: Minutes Units   []  Ultrasound     []  Electrical-Stim     []  Iontophoresis     []  Traction     []  Massage       []  Eval     []  Gait     [x]  Ther Exercise 45  3    []  Manual Therapy       []  Ther Activities       []  Aquatics     []  Vasopneumatic Device     []  Neuro Re-Ed       []  Other       Total Treatment Time: 39 3      PAUL JonesT

## 2019-06-07 ENCOUNTER — HOSPITAL ENCOUNTER (OUTPATIENT)
Dept: PHYSICAL THERAPY | Age: 54
Setting detail: THERAPIES SERIES
Discharge: HOME | End: 2019-06-07
Payer: COMMERCIAL

## 2019-06-07 PROCEDURE — 97110 THERAPEUTIC EXERCISES: CPT

## 2019-06-19 ENCOUNTER — APPOINTMENT (OUTPATIENT)
Dept: PHYSICAL THERAPY | Age: 54
End: 2019-06-19
Payer: COMMERCIAL

## 2019-06-21 NOTE — PROGRESS NOTES
[x] [facility] ([facility]) @ [facility]  [address] [facility] [address] , [address] [address] [facility]  Phone [phone]  Fax [phone]    Physical Therapy Discharge Note    Date: 2019      Patient: Lenin Corea  : 1965  MRN: [id number]    Physician: Allan Sapp MD     Diagnosis: Post-acromioplasty  Onset Date: 2019    Rehab Diagnosis: Decreased mobility of the (R) shoulder with scapular weakness  Total visits attended: 6  Cancels/No shows: 1/0  Date of initial visit: 2019              [] Patient recovered from conditions. Treatment goals were met. [] Patient received maximum benefit. No further therapy indicated at this time. [] Patient demonstrated improvement from condition with  ** Of  ** Short term goals met. []Patient demonstrated improvement from condition with **   Of **  Long term goals met. [] Patient to continue exercise/home instructions independently. [x] Therapy interrupted due to the patient contacting our facility and stating that he wished to discontinue physical therapy at this time. [] Patient has 2 or more no shows/cancels, is discontinued per our policy. [] Patient has completed prescribed number of treatment sessions. [] Other:      Pain level at evaluation was    0   /10 and at discharge was   0    /10    It Is My Understanding That The:  [] Patient returned to work. [] Patient demonstrated improved level of function. [] Patient returned to previous functional level.   [] Patient's current functional status is unknown due to no-shows  [] Other:     Recommendations/Comments:     Treatment Included:  [x] Therapeutic Exercise    [x] Modalities:  [] Therapeutic Activity    [] Ultrasound  [x] Electrical Stimulation  [] Gait Training     [] Massage       [] Lumbar/Cervical Traction  [] Neuromuscular Re-education [] Cold/hot pack [] Iontophoresis: 4 mg/mL  [x] Instruction in Home Exercise Program                     Dexamethasone Sodium  [] Manual Therapy             Phosphate 40-80 mA min  [] Aquatic Therapy                                            [] Other:  [x] Vaso compression                 If you have any questions or concerns regarding this patient's care, please contact us.    Thank you for your referral.      Electronically signed by: Antonia Moise PT DPT

## 2019-09-27 NOTE — PROGRESS NOTES
Physical Therapy  Daily Treatment Note/Cancellation  Date: 5/10/2019  Patient Name: Ning Hess  MRN: [id number]     :   1965    General  Chart Reviewed: Yes  Response To Previous Treatment: Not applicable  Referring Practitioner: Monika Delarosa MD  PT Visit Information  Onset Date: 19  PT Insurance Information: Medical [place]  Total # of Visits Approved: 12  Total # of Visits to Date: 4  No Show: 0  Canceled Appointment: 1  General Comment  Comments: Pt. had to cancel his appointment due to a work    Peter Saxena, Student PT
Chest pain

## 2022-09-12 ENCOUNTER — HOSPITAL ENCOUNTER (EMERGENCY)
Age: 57
Discharge: HOME | End: 2022-09-12
Attending: EMERGENCY MEDICINE
Payer: COMMERCIAL

## 2022-09-12 ENCOUNTER — APPOINTMENT (OUTPATIENT)
Dept: CT IMAGING | Age: 57
End: 2022-09-12
Payer: COMMERCIAL

## 2022-09-12 VITALS
DIASTOLIC BLOOD PRESSURE: 80 MMHG | TEMPERATURE: 97.8 F | BODY MASS INDEX: 26.41 KG/M2 | HEIGHT: 72 IN | OXYGEN SATURATION: 97 % | WEIGHT: 195 LBS | HEART RATE: 70 BPM | SYSTOLIC BLOOD PRESSURE: 137 MMHG | RESPIRATION RATE: 16 BRPM

## 2022-09-12 DIAGNOSIS — R10.9 LEFT FLANK PAIN: ICD-10-CM

## 2022-09-12 DIAGNOSIS — K52.9 COLITIS: Primary | ICD-10-CM

## 2022-09-12 LAB
ABSOLUTE EOS #: 0 K/UL (ref 0–0.4)
ABSOLUTE LYMPH #: 2 K/UL (ref 1–4.8)
ABSOLUTE MONO #: 0.7 K/UL (ref 0.1–1.3)
ALBUMIN SERPL-MCNC: 5.3 G/DL (ref 3.5–5.2)
ALP BLD-CCNC: 84 U/L (ref 40–129)
ALT SERPL-CCNC: 33 U/L (ref 5–41)
ANION GAP SERPL CALCULATED.3IONS-SCNC: 11 MMOL/L (ref 9–17)
AST SERPL-CCNC: 25 U/L
BASOPHILS # BLD: 1 % (ref 0–2)
BASOPHILS ABSOLUTE: 0 K/UL (ref 0–0.2)
BILIRUB SERPL-MCNC: 0.5 MG/DL (ref 0.3–1.2)
BILIRUBIN DIRECT: 0.2 MG/DL
BILIRUBIN URINE: NEGATIVE
BILIRUBIN, INDIRECT: 0.3 MG/DL (ref 0–1)
BUN BLDV-MCNC: 18 MG/DL (ref 6–20)
CALCIUM SERPL-MCNC: 10.2 MG/DL (ref 8.6–10.4)
CHLORIDE BLD-SCNC: 102 MMOL/L (ref 98–107)
CO2: 27 MMOL/L (ref 20–31)
COLOR: YELLOW
COMMENT UA: NORMAL
CREAT SERPL-MCNC: 1.01 MG/DL (ref 0.7–1.2)
EOSINOPHILS RELATIVE PERCENT: 0 % (ref 0–4)
GFR AFRICAN AMERICAN: >60 ML/MIN
GFR NON-AFRICAN AMERICAN: >60 ML/MIN
GFR SERPL CREATININE-BSD FRML MDRD: NORMAL ML/MIN/{1.73_M2}
GLUCOSE BLD-MCNC: 92 MG/DL (ref 70–99)
GLUCOSE URINE: NEGATIVE
HCT VFR BLD CALC: 41.8 % (ref 41–53)
HEMOGLOBIN: 14.6 G/DL (ref 13.5–17.5)
INR BLD: 0.9
KETONES, URINE: NEGATIVE
LEUKOCYTE ESTERASE, URINE: NEGATIVE
LIPASE: 46 U/L (ref 13–60)
LYMPHOCYTES # BLD: 19 % (ref 24–44)
MCH RBC QN AUTO: 31.3 PG (ref 26–34)
MCHC RBC AUTO-ENTMCNC: 34.8 G/DL (ref 31–37)
MCV RBC AUTO: 90 FL (ref 80–100)
MONOCYTES # BLD: 7 % (ref 1–7)
NITRITE, URINE: NEGATIVE
PDW BLD-RTO: 12.6 % (ref 11.5–14.9)
PH UA: 5 (ref 5–8)
PLATELET # BLD: 268 K/UL (ref 150–450)
PMV BLD AUTO: 7.3 FL (ref 6–12)
POTASSIUM SERPL-SCNC: 4.1 MMOL/L (ref 3.7–5.3)
PROTEIN UA: NEGATIVE
PROTHROMBIN TIME: 11.9 SEC (ref 11.8–14.6)
RBC # BLD: 4.65 M/UL (ref 4.5–5.9)
SEG NEUTROPHILS: 73 % (ref 36–66)
SEGMENTED NEUTROPHILS ABSOLUTE COUNT: 7.4 K/UL (ref 1.3–9.1)
SODIUM BLD-SCNC: 140 MMOL/L (ref 135–144)
SPECIFIC GRAVITY UA: 1.02 (ref 1–1.03)
TOTAL PROTEIN: 7.5 G/DL (ref 6.4–8.3)
TURBIDITY: CLEAR
URINE HGB: NEGATIVE
UROBILINOGEN, URINE: NORMAL
WBC # BLD: 10.1 K/UL (ref 3.5–11)

## 2022-09-12 PROCEDURE — 74176 CT ABD & PELVIS W/O CONTRAST: CPT

## 2022-09-12 PROCEDURE — 81003 URINALYSIS AUTO W/O SCOPE: CPT

## 2022-09-12 PROCEDURE — 85025 COMPLETE CBC W/AUTO DIFF WBC: CPT

## 2022-09-12 PROCEDURE — 2580000003 HC RX 258: Performed by: EMERGENCY MEDICINE

## 2022-09-12 PROCEDURE — 99284 EMERGENCY DEPT VISIT MOD MDM: CPT

## 2022-09-12 PROCEDURE — 80048 BASIC METABOLIC PNL TOTAL CA: CPT

## 2022-09-12 PROCEDURE — 85610 PROTHROMBIN TIME: CPT

## 2022-09-12 PROCEDURE — 6360000002 HC RX W HCPCS: Performed by: EMERGENCY MEDICINE

## 2022-09-12 PROCEDURE — 96374 THER/PROPH/DIAG INJ IV PUSH: CPT

## 2022-09-12 PROCEDURE — 83690 ASSAY OF LIPASE: CPT

## 2022-09-12 PROCEDURE — 36415 COLL VENOUS BLD VENIPUNCTURE: CPT

## 2022-09-12 PROCEDURE — 80076 HEPATIC FUNCTION PANEL: CPT

## 2022-09-12 RX ORDER — KETOROLAC TROMETHAMINE 30 MG/ML
15 INJECTION, SOLUTION INTRAMUSCULAR; INTRAVENOUS ONCE
Status: COMPLETED | OUTPATIENT
Start: 2022-09-12 | End: 2022-09-12

## 2022-09-12 RX ORDER — 0.9 % SODIUM CHLORIDE 0.9 %
1000 INTRAVENOUS SOLUTION INTRAVENOUS ONCE
Status: COMPLETED | OUTPATIENT
Start: 2022-09-12 | End: 2022-09-12

## 2022-09-12 RX ADMIN — KETOROLAC TROMETHAMINE 15 MG: 30 INJECTION, SOLUTION INTRAMUSCULAR; INTRAVENOUS at 13:49

## 2022-09-12 RX ADMIN — SODIUM CHLORIDE 1000 ML: 9 INJECTION, SOLUTION INTRAVENOUS at 13:48

## 2022-09-12 ASSESSMENT — PAIN - FUNCTIONAL ASSESSMENT
PAIN_FUNCTIONAL_ASSESSMENT: 0-10
PAIN_FUNCTIONAL_ASSESSMENT: 0-10

## 2022-09-12 ASSESSMENT — ENCOUNTER SYMPTOMS
PHOTOPHOBIA: 0
TROUBLE SWALLOWING: 0
DIARRHEA: 0
COLOR CHANGE: 0
NAUSEA: 0
COUGH: 0
ABDOMINAL PAIN: 1
SHORTNESS OF BREATH: 0
VOMITING: 0

## 2022-09-12 ASSESSMENT — PAIN DESCRIPTION - LOCATION: LOCATION: ABDOMEN

## 2022-09-12 ASSESSMENT — PAIN SCALES - GENERAL
PAINLEVEL_OUTOF10: 4
PAINLEVEL_OUTOF10: 8

## 2022-09-12 NOTE — ED PROVIDER NOTES
History:   Procedure Laterality Date    VASECTOMY       CURRENT MEDICATIONS       Previous Medications    MULTIPLE VITAMINS-MINERALS (MULTIVITAMIN PO)    Take  by mouth daily. ALLERGIES     has No Known Allergies. FAMILY HISTORY     He indicated that his mother is alive. He indicated that his father is alive. He indicated that both of his sisters are alive. SOCIAL HISTORY       Social History     Tobacco Use    Smoking status: Never    Smokeless tobacco: Never     PHYSICAL EXAM     INITIAL VITALS: BP (!) 141/76   Pulse 72   Temp 97.9 °F (36.6 °C) (Oral)   Resp 18   Ht 6' (1.829 m)   Wt 195 lb (88.5 kg)   SpO2 98%   BMI 26.45 kg/m²    Physical Exam  Constitutional:       General: He is not in acute distress. Appearance: Normal appearance. HENT:      Head: Normocephalic and atraumatic. Right Ear: External ear normal.      Left Ear: External ear normal.      Nose: Nose normal. No congestion or rhinorrhea. Eyes:      Extraocular Movements: Extraocular movements intact. Pupils: Pupils are equal, round, and reactive to light. Cardiovascular:      Rate and Rhythm: Normal rate and regular rhythm. Pulses: Normal pulses. Heart sounds: Normal heart sounds. Pulmonary:      Effort: Pulmonary effort is normal. No respiratory distress. Breath sounds: Normal breath sounds. Abdominal:      General: Bowel sounds are normal.      Palpations: Abdomen is soft. Tenderness: There is abdominal tenderness in the left upper quadrant. Musculoskeletal:         General: No deformity. Normal range of motion. Cervical back: Normal range of motion. No rigidity. Skin:     General: Skin is warm and dry. Neurological:      General: No focal deficit present. Mental Status: He is alert and oriented to person, place, and time. Mental status is at baseline.    Psychiatric:         Mood and Affect: Mood normal.         Behavior: Behavior normal.       MEDICAL DECISION MAKING: Patient with left flank pain. Patient with left upper quadrant abdominal pain. CT imaging did display signs of colitis. It did not display signs of a kidney stone or diverticulitis. Patient instructed to follow-up with primary care physician within 2 days and to return to the ER immediately if symptoms worsen or change. Patient understands and agrees with the plan. CRITICAL CARE:       PROCEDURES:    Procedures    DIAGNOSTIC RESULTS   EKG:All EKG's are interpreted by the Emergency Department Physician who either signs or Co-signs this chart in the absence of a cardiologist.        RADIOLOGY:All plain film, CT, MRI, and formal ultrasound images (except ED bedside ultrasound) are read by the radiologist, see reports below, unless otherwisenoted in MDM or here. CT ABDOMEN PELVIS WO CONTRAST Additional Contrast? None   Preliminary Result   Nonspecific short segment mild acute colitis involving the proximal   descending colon. LABS: All lab results were reviewed by myself, and all abnormals are listed below.   Labs Reviewed   HEPATIC FUNCTION PANEL - Abnormal; Notable for the following components:       Result Value    Albumin 5.3 (*)     All other components within normal limits   CBC WITH AUTO DIFFERENTIAL - Abnormal; Notable for the following components:    Seg Neutrophils 73 (*)     Lymphocytes 19 (*)     All other components within normal limits   URINALYSIS WITH REFLEX TO CULTURE   PROTIME-INR   LIPASE   BASIC METABOLIC PANEL       EMERGENCY DEPARTMENTCOURSE:         Vitals:    Vitals:    09/12/22 1151   BP: (!) 141/76   Pulse: 72   Resp: 18   Temp: 97.9 °F (36.6 °C)   TempSrc: Oral   SpO2: 98%   Weight: 195 lb (88.5 kg)   Height: 6' (1.829 m)       The patient was given the following medications while in the emergency department:  Orders Placed This Encounter   Medications    ketorolac (TORADOL) injection 15 mg    0.9 % sodium chloride bolus     CONSULTS:  None    FINAL IMPRESSION      1. Colitis    2. Left flank pain          DISPOSITION/PLAN   DISPOSITION Decision To Discharge 09/12/2022 02:15:27 PM      PATIENT REFERRED TO:  Maureen Kwon  [address]  [address]  [phone]    Schedule an appointment as soon as possible for a visit in 2 days      [facility] ED  [address] [address]  [phone]  Go to   As needed, If symptoms worsen  DISCHARGE MEDICATIONS:  New Prescriptions    No medications on file     The care is provided during an unprecedented national emergency due to the novel coronavirus, COVID 19.   Johny Mary DO

## 2022-09-12 NOTE — ED TRIAGE NOTES
Mode of arrival (squad #, walk in, police, etc) : walk-in        Chief complaint(s): left flank pain        Arrival Note (brief scenario, treatment PTA, etc). : Patient reports left flank pain x1 day. Patient denies hx of kidney stones. C= \"Have you ever felt that you should Cut down on your drinking? \"  No  A= \"Have people Annoyed you by criticizing your drinking? \"  No  G= \"Have you ever felt bad or Guilty about your drinking? \"  No  E= \"Have you ever had a drink as an Eye-opener first thing in the morning to steady your nerves or to help a hangover? \"  No      Deferred []      Reason for deferring: N/A    *If yes to two or more: probable alcohol abuse. *